# Patient Record
Sex: FEMALE | NOT HISPANIC OR LATINO | Employment: FULL TIME | URBAN - METROPOLITAN AREA
[De-identification: names, ages, dates, MRNs, and addresses within clinical notes are randomized per-mention and may not be internally consistent; named-entity substitution may affect disease eponyms.]

---

## 2022-06-16 ENCOUNTER — OFFICE VISIT (OUTPATIENT)
Dept: URGENT CARE | Facility: URGENT CARE | Age: 27
End: 2022-06-16
Payer: COMMERCIAL

## 2022-06-16 VITALS
SYSTOLIC BLOOD PRESSURE: 114 MMHG | HEART RATE: 59 BPM | TEMPERATURE: 97.6 F | DIASTOLIC BLOOD PRESSURE: 72 MMHG | OXYGEN SATURATION: 98 %

## 2022-06-16 DIAGNOSIS — H02.849 SWELLING OF EYELID, UNSPECIFIED LATERALITY: Primary | ICD-10-CM

## 2022-06-16 DIAGNOSIS — L55.9 SUNBURN: ICD-10-CM

## 2022-06-16 PROCEDURE — 99203 OFFICE O/P NEW LOW 30 MIN: CPT | Performed by: FAMILY MEDICINE

## 2022-06-16 RX ORDER — PREDNISONE 20 MG/1
40 TABLET ORAL DAILY
Qty: 10 TABLET | Refills: 0 | Status: SHIPPED | OUTPATIENT
Start: 2022-06-16 | End: 2022-06-21

## 2022-06-16 NOTE — PATIENT INSTRUCTIONS
Okay to take ibuprofen 200 mg - 4 tablets (800 mg) every 8 hours as needed.  Okay to take tylenol 500 mg - 2 tablets (1000 mg) every 6-8 hours as needed, do not exceed 3000 mg in 24 hours.  Take full course of prednisone burst to help with swelling (?allergic reaction)    Use liberal sunscreen to prevent sunburn    Okay to use refresh lubricating tears

## 2022-06-16 NOTE — PROGRESS NOTES
SUBJECTIVE:  Chief Complaint   Patient presents with     Urgent Care     FACIAL SWELLING SINCE THIS MORNING/ redness in her eyes for about a week eyes feel heavy think she may has sun postioning eyes feel really heavy .      Su Narvaez is a 26 year old female who presents with a chief complaint of facial swelling.    Got sunburned on Sunday, on face and chest.  Was outside from 11am-8pm  Did wear sunscreen and did apply several times throughout the day.    Ended up blistering on forehead and chest, ended up rubbing forehead and blisters popped and peeled off.  Noticed more eyelids have been more puffy and heavy.  Had mild redness and drainage and tearing but denies any mattering or purulent discharge from eyes.    Has been more drained.  Has been using refresh drops as she has history of dry eyes.  Has asthma, denies any SOB symptoms.    No past medical history on file.  No current outpatient medications on file.     Social History     Tobacco Use     Smoking status: Never Smoker     Smokeless tobacco: Never Used   Substance Use Topics     Alcohol use: Yes       ROS:  Review of systems negative except as stated above.    EXAM:   /72 (BP Location: Right arm, Patient Position: Sitting, Cuff Size: Adult Large)   Pulse 59   Temp 97.6  F (36.4  C)   LMP 06/15/2022   SpO2 98%   GENERAL APPEARANCE: healthy, alert and no distress  EYES: PERRL, EOM intact, conjunctiva faint pink with no mattering or drainage, bilateral eyelids mild swelling  EXTREMITIES: peripheral pulses normal  SKIN: irregular pink patches on forehead, scattered redden macules/patches on anterior chest with peeling skin  PSYCH: alert, affect bright      ASSESSMENT/PLAN:  (H02.849) Swelling of eyelid, unspecified laterality  (primary encounter diagnosis)  Plan: predniSONE (DELTASONE) 20 MG tablet            (L55.9) Sunburn  Comment: healing  Plan: sunscreen      Suspect that eyelid symptoms most likely due to movement of swelling from  burn on forehead downward but can not rule out possible allergic type reaction.  Due to eyelids symptoms will start steroid - RX prednisone burst given.  Encourage to apply cool compress to area.  Okay to continue with artificial lubricating eye drops.  Okay for tylenol and ibuprofen for discomfort.    Follow up with primary provider if no improvement of symptoms in 1 week    Kian Roth MD  June 16, 2022 5:04 PM

## 2022-07-25 ENCOUNTER — OFFICE VISIT (OUTPATIENT)
Dept: URGENT CARE | Facility: URGENT CARE | Age: 27
End: 2022-07-25
Payer: COMMERCIAL

## 2022-07-25 VITALS
OXYGEN SATURATION: 100 % | HEART RATE: 66 BPM | DIASTOLIC BLOOD PRESSURE: 68 MMHG | TEMPERATURE: 97.6 F | SYSTOLIC BLOOD PRESSURE: 100 MMHG

## 2022-07-25 DIAGNOSIS — R00.2 PALPITATIONS: Primary | ICD-10-CM

## 2022-07-25 DIAGNOSIS — M94.0 COSTOCHONDRITIS: ICD-10-CM

## 2022-07-25 PROCEDURE — 93000 ELECTROCARDIOGRAM COMPLETE: CPT | Performed by: FAMILY MEDICINE

## 2022-07-25 PROCEDURE — 99213 OFFICE O/P EST LOW 20 MIN: CPT | Performed by: FAMILY MEDICINE

## 2022-07-25 RX ORDER — NORGESTIMATE AND ETHINYL ESTRADIOL 0.25-0.035
1 KIT ORAL DAILY
COMMUNITY
Start: 2022-05-05

## 2022-07-25 RX ORDER — TOPIRAMATE 100 MG/1
100 TABLET, FILM COATED ORAL 2 TIMES DAILY
COMMUNITY
Start: 2022-07-05

## 2022-07-25 NOTE — PROGRESS NOTES
SUBJECTIVE:  Patient presents with chest pressure, palpitation, feeling of SOB over the last two weeks.  Checks on wrist monitor and wonders about significance of what she is seeing.  Working as a temp Med Assistant in another clinic.  From [facility].  Woke middle of the night once last week feeling with SOB but resolved.  No fever or cough.    No past medical history on file.  Current Outpatient Medications   Medication Sig Dispense Refill     SPRINTEC 28 0.25-35 MG-MCG tablet Take 1 tablet by mouth daily       topiramate (TOPAMAX) 100 MG tablet Take 100 mg by mouth 2 times daily       History   Smoking Status     Never Smoker   Smokeless Tobacco     Never Used         OBJECITVE;  /68 (BP Location: Right arm, Patient Position: Sitting, Cuff Size: Adult Large)   Pulse 66   Temp 97.6  F (36.4  C) (Tympanic)   LMP 06/15/2022   SpO2 100%   Alert oriented  Heart: reg without m.  See rate and Sats above  Lungs: clear  Chest:  Tender 3 and 4 costosternal junctions rt  ASSESSMENT:  Costochondritis  Palpitations  Mild anxiety    PLAN:  ECG is normal    Symptomatic therapy suggested: increase fluids and Reassure that no cardiac findings on ECT..    Follow up with primary care provider if no improvement.

## 2022-08-03 ENCOUNTER — APPOINTMENT (OUTPATIENT)
Dept: MRI IMAGING | Facility: CLINIC | Age: 27
End: 2022-08-03
Attending: EMERGENCY MEDICINE
Payer: COMMERCIAL

## 2022-08-03 ENCOUNTER — HOSPITAL ENCOUNTER (EMERGENCY)
Facility: CLINIC | Age: 27
Discharge: HOME | End: 2022-08-03
Attending: EMERGENCY MEDICINE | Admitting: EMERGENCY MEDICINE
Payer: COMMERCIAL

## 2022-08-03 VITALS
HEIGHT: 68 IN | RESPIRATION RATE: 16 BRPM | WEIGHT: 228 LBS | TEMPERATURE: 97.2 F | SYSTOLIC BLOOD PRESSURE: 99 MMHG | OXYGEN SATURATION: 95 % | HEART RATE: 58 BPM | BODY MASS INDEX: 34.56 KG/M2 | DIASTOLIC BLOOD PRESSURE: 58 MMHG

## 2022-08-03 DIAGNOSIS — R07.9 CHEST PAIN, UNSPECIFIED TYPE: ICD-10-CM

## 2022-08-03 DIAGNOSIS — R51.9 ACUTE NONINTRACTABLE HEADACHE, UNSPECIFIED HEADACHE TYPE: ICD-10-CM

## 2022-08-03 DIAGNOSIS — D35.2 PITUITARY ADENOMA (H): ICD-10-CM

## 2022-08-03 LAB
ALBUMIN SERPL-MCNC: 3.9 G/DL (ref 3.4–5)
ALP SERPL-CCNC: 44 U/L (ref 40–150)
ALT SERPL W P-5'-P-CCNC: 27 U/L (ref 0–50)
ANION GAP SERPL CALCULATED.3IONS-SCNC: 4 MMOL/L (ref 3–14)
AST SERPL W P-5'-P-CCNC: 19 U/L (ref 0–45)
ATRIAL RATE - MUSE: 66 BPM
BASOPHILS # BLD AUTO: 0 10E3/UL (ref 0–0.2)
BASOPHILS NFR BLD AUTO: 1 %
BILIRUB SERPL-MCNC: 0.3 MG/DL (ref 0.2–1.3)
BUN SERPL-MCNC: 13 MG/DL (ref 7–30)
CALCIUM SERPL-MCNC: 9 MG/DL (ref 8.5–10.1)
CHLORIDE BLD-SCNC: 111 MMOL/L (ref 94–109)
CO2 SERPL-SCNC: 23 MMOL/L (ref 20–32)
CREAT SERPL-MCNC: 0.94 MG/DL (ref 0.52–1.04)
D DIMER PPP FEU-MCNC: 0.37 UG/ML FEU (ref 0–0.5)
DIASTOLIC BLOOD PRESSURE - MUSE: NORMAL MMHG
EOSINOPHIL # BLD AUTO: 0.2 10E3/UL (ref 0–0.7)
EOSINOPHIL NFR BLD AUTO: 4 %
ERYTHROCYTE [DISTWIDTH] IN BLOOD BY AUTOMATED COUNT: 12 % (ref 10–15)
GFR SERPL CREATININE-BSD FRML MDRD: 84 ML/MIN/1.73M2
GLUCOSE BLD-MCNC: 87 MG/DL (ref 70–99)
HCT VFR BLD AUTO: 38.8 % (ref 35–47)
HGB BLD-MCNC: 12.8 G/DL (ref 11.7–15.7)
IMM GRANULOCYTES # BLD: 0 10E3/UL
IMM GRANULOCYTES NFR BLD: 0 %
INTERPRETATION ECG - MUSE: NORMAL
LYMPHOCYTES # BLD AUTO: 2.7 10E3/UL (ref 0.8–5.3)
LYMPHOCYTES NFR BLD AUTO: 60 %
MCH RBC QN AUTO: 29.2 PG (ref 26.5–33)
MCHC RBC AUTO-ENTMCNC: 33 G/DL (ref 31.5–36.5)
MCV RBC AUTO: 89 FL (ref 78–100)
MONOCYTES # BLD AUTO: 0.3 10E3/UL (ref 0–1.3)
MONOCYTES NFR BLD AUTO: 7 %
NEUTROPHILS # BLD AUTO: 1.2 10E3/UL (ref 1.6–8.3)
NEUTROPHILS NFR BLD AUTO: 28 %
NRBC # BLD AUTO: 0 10E3/UL
NRBC BLD AUTO-RTO: 0 /100
P AXIS - MUSE: 44 DEGREES
PLATELET # BLD AUTO: 263 10E3/UL (ref 150–450)
POTASSIUM BLD-SCNC: 3.8 MMOL/L (ref 3.4–5.3)
PR INTERVAL - MUSE: 164 MS
PROT SERPL-MCNC: 7.4 G/DL (ref 6.8–8.8)
QRS DURATION - MUSE: 118 MS
QT - MUSE: 414 MS
QTC - MUSE: 434 MS
R AXIS - MUSE: 31 DEGREES
RBC # BLD AUTO: 4.38 10E6/UL (ref 3.8–5.2)
SODIUM SERPL-SCNC: 138 MMOL/L (ref 133–144)
SYSTOLIC BLOOD PRESSURE - MUSE: NORMAL MMHG
T AXIS - MUSE: 18 DEGREES
TROPONIN I SERPL HS-MCNC: 4 NG/L
TSH SERPL DL<=0.005 MIU/L-ACNC: 2.25 MU/L (ref 0.4–4)
VENTRICULAR RATE- MUSE: 66 BPM
WBC # BLD AUTO: 4.4 10E3/UL (ref 4–11)

## 2022-08-03 PROCEDURE — 84146 ASSAY OF PROLACTIN: CPT | Performed by: PHYSICIAN ASSISTANT

## 2022-08-03 PROCEDURE — 96374 THER/PROPH/DIAG INJ IV PUSH: CPT | Mod: 59

## 2022-08-03 PROCEDURE — 36415 COLL VENOUS BLD VENIPUNCTURE: CPT | Performed by: EMERGENCY MEDICINE

## 2022-08-03 PROCEDURE — 84481 FREE ASSAY (FT-3): CPT | Performed by: PHYSICIAN ASSISTANT

## 2022-08-03 PROCEDURE — 99285 EMERGENCY DEPT VISIT HI MDM: CPT | Mod: 25

## 2022-08-03 PROCEDURE — 250N000011 HC RX IP 250 OP 636: Performed by: EMERGENCY MEDICINE

## 2022-08-03 PROCEDURE — 83002 ASSAY OF GONADOTROPIN (LH): CPT | Performed by: PHYSICIAN ASSISTANT

## 2022-08-03 PROCEDURE — 85379 FIBRIN DEGRADATION QUANT: CPT | Performed by: EMERGENCY MEDICINE

## 2022-08-03 PROCEDURE — 84702 CHORIONIC GONADOTROPIN TEST: CPT | Performed by: PHYSICIAN ASSISTANT

## 2022-08-03 PROCEDURE — 83001 ASSAY OF GONADOTROPIN (FSH): CPT | Performed by: PHYSICIAN ASSISTANT

## 2022-08-03 PROCEDURE — 80053 COMPREHEN METABOLIC PANEL: CPT | Performed by: EMERGENCY MEDICINE

## 2022-08-03 PROCEDURE — 96375 TX/PRO/DX INJ NEW DRUG ADDON: CPT

## 2022-08-03 PROCEDURE — 84484 ASSAY OF TROPONIN QUANT: CPT | Performed by: EMERGENCY MEDICINE

## 2022-08-03 PROCEDURE — 83003 ASSAY GROWTH HORMONE (HGH): CPT | Performed by: PHYSICIAN ASSISTANT

## 2022-08-03 PROCEDURE — 70553 MRI BRAIN STEM W/O & W/DYE: CPT

## 2022-08-03 PROCEDURE — 96361 HYDRATE IV INFUSION ADD-ON: CPT

## 2022-08-03 PROCEDURE — 82040 ASSAY OF SERUM ALBUMIN: CPT | Performed by: EMERGENCY MEDICINE

## 2022-08-03 PROCEDURE — 84305 ASSAY OF SOMATOMEDIN: CPT | Performed by: PHYSICIAN ASSISTANT

## 2022-08-03 PROCEDURE — 84443 ASSAY THYROID STIM HORMONE: CPT | Performed by: PHYSICIAN ASSISTANT

## 2022-08-03 PROCEDURE — 255N000002 HC RX 255 OP 636: Performed by: EMERGENCY MEDICINE

## 2022-08-03 PROCEDURE — A9585 GADOBUTROL INJECTION: HCPCS | Performed by: EMERGENCY MEDICINE

## 2022-08-03 PROCEDURE — 85025 COMPLETE CBC W/AUTO DIFF WBC: CPT | Performed by: EMERGENCY MEDICINE

## 2022-08-03 PROCEDURE — 258N000003 HC RX IP 258 OP 636: Performed by: EMERGENCY MEDICINE

## 2022-08-03 PROCEDURE — 36415 COLL VENOUS BLD VENIPUNCTURE: CPT | Performed by: PHYSICIAN ASSISTANT

## 2022-08-03 RX ORDER — SODIUM CHLORIDE 9 MG/ML
INJECTION, SOLUTION INTRAVENOUS CONTINUOUS
Status: DISCONTINUED | OUTPATIENT
Start: 2022-08-03 | End: 2022-08-03 | Stop reason: HOSPADM

## 2022-08-03 RX ORDER — GADOBUTROL 604.72 MG/ML
10 INJECTION INTRAVENOUS ONCE
Status: COMPLETED | OUTPATIENT
Start: 2022-08-03 | End: 2022-08-03

## 2022-08-03 RX ORDER — DIPHENHYDRAMINE HYDROCHLORIDE 50 MG/ML
25 INJECTION INTRAMUSCULAR; INTRAVENOUS ONCE
Status: COMPLETED | OUTPATIENT
Start: 2022-08-03 | End: 2022-08-03

## 2022-08-03 RX ORDER — METOCLOPRAMIDE HYDROCHLORIDE 5 MG/ML
10 INJECTION INTRAMUSCULAR; INTRAVENOUS ONCE
Status: COMPLETED | OUTPATIENT
Start: 2022-08-03 | End: 2022-08-03

## 2022-08-03 RX ADMIN — METOCLOPRAMIDE 10 MG: 5 INJECTION, SOLUTION INTRAMUSCULAR; INTRAVENOUS at 12:25

## 2022-08-03 RX ADMIN — GADOBUTROL 10 ML: 604.72 INJECTION INTRAVENOUS at 18:25

## 2022-08-03 RX ADMIN — DIPHENHYDRAMINE HYDROCHLORIDE 25 MG: 50 INJECTION, SOLUTION INTRAMUSCULAR; INTRAVENOUS at 12:24

## 2022-08-03 RX ADMIN — SODIUM CHLORIDE 1000 ML: 9 INJECTION, SOLUTION INTRAVENOUS at 12:26

## 2022-08-03 ASSESSMENT — ENCOUNTER SYMPTOMS
NAUSEA: 1
APPETITE CHANGE: 1
ABDOMINAL PAIN: 1
BLOOD IN STOOL: 0
PHOTOPHOBIA: 1
VOMITING: 0
HEADACHES: 1
EYE PAIN: 1

## 2022-08-03 NOTE — ED TRIAGE NOTES
Pt. States she has chronic migraines, had one last Friday and continues to linger. Pt. States pressure is mostly on right side of head, and right eye feels like it is going to pop out.     Triage Assessment     Row Name 08/03/22 1138       Triage Assessment (Adult)    Airway WDL WDL       Respiratory WDL    Respiratory WDL WDL       Skin Circulation/Temperature WDL    Skin Circulation/Temperature WDL WDL       Cardiac WDL    Cardiac WDL WDL       Peripheral/Neurovascular WDL    Peripheral Neurovascular WDL WDL       Cognitive/Neuro/Behavioral WDL    Cognitive/Neuro/Behavioral WDL WDL

## 2022-08-03 NOTE — DISCHARGE INSTRUCTIONS
Please follow up with a primary care clinic today.  Please trial taking omeprazole 20 mg daily for 2 weeks and stop using NSAIDs for pain.  Please return to the emergency department with any new or concerning symptoms.    Discharge Instructions  Headache    You were seen today for a headache. Headaches may be caused by many different things such as muscle tension, sinus inflammation, anxiety and stress, having too little sleep, too much alcohol, some medical conditions or injury. You may have a migraine, which is caused by changes in the blood vessels in your head.  At this time your provider does not find that your headache is a sign of anything dangerous or life-threatening.  However, sometimes the signs of serious illness do not show up right away.      Generally, every Emergency Department visit should have a follow-up clinic visit with either a primary or a specialty clinic/provider. Please follow-up as instructed by your emergency provider today.    Return to the Emergency Department if:  You get a new fever of 100.4 F or higher.  Your headache gets much worse.  You get a stiff neck with your headache.  You get a new headache that is significantly different or worse than headaches you have had before.  You are vomiting (throwing up) and cannot keep food or water down.  You have blurry or double vision or other problems with your eyes.  You have a new weakness on one side of your body.  You have difficulty with balance which is new.  You or your family thinks you are confused.  You have a seizure.    What can I do to help myself?  Pain medications - You may take a pain medication such as Tylenol  (acetaminophen), Advil , Motrin  (ibuprofen) or Aleve  (naproxen).  Take a pain reliever as soon as you notice symptoms.  Starting medications as soon as you start to have symptoms may lessen the amount of pain you have.  Relaxing in a quiet, dark room may help.  Get enough sleep and eat meals regularly.  You may need  to watch for certain foods or other things which may trigger your headaches.  Keeping a journal of your headaches and possible triggers may help you and your primary provider to identify things which you should avoid which may be causing your headaches.  If you were given a prescription for medicine here today, be sure to read all of the information (including the package insert) that comes with your prescription.  This will include important information about the medicine, its side effects, and any warnings that you need to know about.  The pharmacist who fills the prescription can provide more information and answer questions you may have about the medicine.  If you have questions or concerns that the pharmacist cannot address, please call or return to the Emergency Department.   Remember that you can always come back to the Emergency Department if you are not able to see your regular provider in the amount of time listed above, if you get any new symptoms, or if there is anything that worries you.     Discharge Instructions  Chest Pain    You have been seen today for chest pain or discomfort.  At this time, your provider has found no signs that your chest pain is due to a serious or life-threatening condition, (or you have declined more testing and/or admission to the hospital). However, sometimes there is a serious problem that does not show up right away. Your evaluation today may not be complete and you may need further testing and evaluation.     Generally, every Emergency Department visit should have a follow-up clinic visit with either a primary or a specialty clinic/provider. Please follow-up as instructed by your emergency provider today.  Return to the Emergency Department if:  Your chest pain changes, gets worse, starts to happen more often, or comes with less activity.  You are newly short of breath.  You get very weak or tired.  You pass out or faint.  You have any new symptoms, like fever, cough,  numb legs, or you cough up blood.  You have anything else that worries you.    Until you follow-up with your regular provider, please do the following:  Take one aspirin daily unless you have an allergy or are told not to by your provider.  If a stress test appointment has been made, go to the appointment.  If you have questions, contact your regular provider.  Follow-up with your regular provider/clinic as directed; this is very important.    If you were given a prescription for medicine here today, be sure to read all of the information (including the package insert) that comes with your prescription.  This will include important information about the medicine, its side effects, and any warnings that you need to know about.  The pharmacist who fills the prescription can provide more information and answer questions you may have about the medicine.  If you have questions or concerns that the pharmacist cannot address, please call or return to the Emergency Department.       Remember that you can always come back to the Emergency Department if you are not able to see your regular provider in the amount of time listed above, if you get any new symptoms, or if there is anything that worries you.

## 2022-08-03 NOTE — ED PROVIDER NOTES
"  History   Chief Complaint:  Headache       HPI   Su Narvaez is a 26 year old female with history of migraines who presents with headache. The patient reports she has had the worst migraine of her life this past Friday 7/29. She states since she has been having a lingering migraine since. She takes Topamax regularly and has been taking Excedrin extra strength 3/4 times a day since Friday. She states with her migraine Friday she had photophobia and eye pressure. The patient reports she still has painful eye pressure. She reports significantly decreased PO intake causing abdominal burning, chest pain, and nausea. The patient denies any vomiting, hematemesis, black or bloody stools. Of note patient is visiting here from [place] for work. Patient is on migraine regimen and normally gets little headaches but she states she has not had a debilitating migraine like this in years.     Over the past week she has noticed intermittently that her right eye is drifting outward and her eye was stuck. Possibly mild blurriness of her vision but nothing that has acutely changed.    Review of Systems   Constitutional: Positive for appetite change.   Eyes: Positive for photophobia and pain.   Cardiovascular: Positive for chest pain.   Gastrointestinal: Positive for abdominal pain and nausea. Negative for blood in stool and vomiting.   Neurological: Positive for headaches.   All other systems reviewed and are negative.      Allergies:  Adhesive Tape  Codeine    Medications:  Topamax   Extra Strength Excedrin  Sprintec   Desyrel    Past Medical History:     Migraines   Asthma     Past Surgical History:    Appendectomy   Left hand surgery     Social History:  The patient presents to the ED alone.     Physical Exam     Patient Vitals for the past 24 hrs:   BP Temp Temp src Pulse Resp SpO2 Height Weight   08/03/22 1134 116/74 97.2  F (36.2  C) Temporal 76 16 95 % 1.727 m (5' 8\") 103.4 kg (228 lb)       Physical Exam  Eyes:  The " pupils are equal and round    Conjunctivae and sclerae are normal  ENT:    The nose is normal    Pinnae are normal    The oropharynx is normal  Neck:  Normal range of motion    There is no rigidity noted    Trachea is in the midline  CV:  Regular rate and rhythm     No edema  Resp:  Lungs are clear    Non-labored    No rales    No wheezing   GI:  Abdomen is soft and non-tender, there is no rigidity    No distension    No rebound tenderness   MS:  Normal muscular tone    No asymmetric leg swelling  Skin:  No rash or acute skin lesions noted  Neuro:   Awake, alert, GCS 15    Speech is normal and fluent    Face is symmetric    Moves all extremities    Normal finger-nose-finger     strength equal bilaterally    Equal sensation bilaterally    Hip flexion 5/5 bilaterally     Visual fields intact    EOM normal      Emergency Department Course   ECG  ECG taken at 1229, ECG read at 1235  Normal sinus rhythm   Incomplete right bundle branch block    Rate 66 bpm. NM interval 164 ms. QRS duration 118 ms. QT/QTc 414/434 ms. P-R-T axes 44 31 18.     Imaging:  MR Brain w/o & w Contrast   Final Result   IMPRESSION: Avidly enhancing sellar and suprasellar mass measuring approximately 2.3 x 2.7 x 2.6 cm in size. 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 The mass extends into the suprasellar cistern and exerts mass effect on the optic    chiasm. Recommend neurosurgical consult.         Findings were discussed with Dr. Olvera at 1834 hours on 08/03/2022.        Report per radiology    Laboratory:  Labs Ordered and Resulted from Time of ED Arrival to Time of ED Departure   COMPREHENSIVE METABOLIC PANEL - Abnormal       Result Value    Sodium 138      Potassium 3.8      Chloride 111 (*)     Carbon Dioxide (CO2) 23      Anion Gap 4      Urea Nitrogen 13      Creatinine 0.94      Calcium 9.0      Glucose 87      Alkaline Phosphatase 44      AST 19      ALT 27      Protein Total 7.4      Albumin 3.9      Bilirubin Total  0.3      GFR Estimate 84     CBC WITH PLATELETS AND DIFFERENTIAL - Abnormal    WBC Count 4.4      RBC Count 4.38      Hemoglobin 12.8      Hematocrit 38.8      MCV 89      MCH 29.2      MCHC 33.0      RDW 12.0      Platelet Count 263      % Neutrophils 28      % Lymphocytes 60      % Monocytes 7      % Eosinophils 4      % Basophils 1      % Immature Granulocytes 0      NRBCs per 100 WBC 0      Absolute Neutrophils 1.2 (*)     Absolute Lymphocytes 2.7      Absolute Monocytes 0.3      Absolute Eosinophils 0.2      Absolute Basophils 0.0      Absolute Immature Granulocytes 0.0      Absolute NRBCs 0.0     TROPONIN I - Normal    Troponin I High Sensitivity 4     D DIMER QUANTITATIVE - Normal    D-Dimer Quantitative 0.37          Procedures    Emergency Department Course:       Reviewed:  I reviewed nursing notes, vitals and past medical history    Assessments:  1149 I obtained history and examined the patient as noted above.     Interventions:  Medications   0.9% sodium chloride BOLUS (0 mLs Intravenous Stopped 8/3/22 1326)     Followed by   sodium chloride 0.9% infusion ( Intravenous Canceled Entry 8/3/22 1230)   gadobutrol (GADAVIST) injection 10 mL (has no administration in time range)   metoclopramide (REGLAN) injection 10 mg (10 mg Intravenous Given 8/3/22 1225)   diphenhydrAMINE (BENADRYL) injection 25 mg (25 mg Intravenous Given 8/3/22 1224)     Disposition:  Care of the patient was transferred to my colleague Dr. Celestin pending NSGY consultation.    Impression & Plan     CMS Diagnoses: None    Medical Decision Making:  Su Narvaez is a 26 year old female who presents to the emergency department with headaches.  It started gradually on Friday and worsened through several days.  It had improved and returned more towards normal after using Excedrin regularly.  This morning she woke up and she had return of her headache again.  Additionally she noted that her eye was not moving normally and she feels like  this has been happening now over the past week or so.  She has a history of migraines and is on Topamax and this had been controlling her migraines very well.  She never had any advanced imaging of her brain performed before.  She notes some stomach irritation related to her Excedrin use.  Additionally she was seen in the urgent care several days ago for chest discomfort which she is still experiencing.  EKG did not show any acute ischemic changes but did show some signs of a right bundle branch block.  D-dimer then added onto her labs and was normal.  Troponin was negative.  Given duration of her symptoms doubt ACS.  Additionally with D-dimer being negative I do not think she needs further work-up for DVT or PE.  She was given Reglan and Benadryl for her headache and noted significant improvement of her symptoms.  She is able to sleep and her headache almost entirely resolved.      Given the abnormal movements of her eyes and developing headaches again, will obtain MRI to evaluate for any brain abnormality.  MRI shows pituitary macroadenoma appearance with apparent pressure on the optic chiasm. NSGY consultation recommended. Patient signed out to my partner awaiting neurosurgery consultation.    Discussed using omeprazole over-the-counter for 2 weeks for stomach irritation after Excedrin use.    She is traveling but will still be here through January or February, so recommended that she establish care with a primary care clinic here in [place].    Diagnosis:    ICD-10-CM    1. Acute nonintractable headache, unspecified headache type  R51.9    2. Chest pain, unspecified type  R07.9        Discharge Medications:  New Prescriptions    No medications on file       Scribe Disclosure:  I, Shanti Valdes, am serving as a scribe at 11:49 AM on 8/3/2022 to document services personally performed by Myke Olvera MD based on my observations and the provider's statements to me.        Myke Olvera,  MD  08/03/22 0687       Myke Olvera MD  08/03/22 6723

## 2022-08-04 LAB
FSH SERPL IRP2-ACNC: <0.3 MIU/ML
GH SERPL-MCNC: 0.2 UG/L
HCG-TM SERPL-ACNC: <3 IU/L
LH SERPL-ACNC: 0.6 MIU/ML
PROLACTIN SERPL 3RD IS-MCNC: >4700 NG/ML (ref 5–23)
T3FREE SERPL-MCNC: 2 PG/ML (ref 2–4.4)

## 2022-08-04 NOTE — PROGRESS NOTES
Contacted regarding 25 yo female presenting to ER with 4 days of headache and one week of intermittent right eye drifting.  Neuro intact in ER including EOM's.  Normal sodium level.    Brain MRI reveals pituitary mass:  IMPRESSION: Avidly enhancing sellar and suprasellar mass measuring approximately 2.3 x 2.7 x 2.6 cm in size. 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 The mass extends into the suprasellar cistern and exerts mass effect on the optic   chiasm. Recommend neurosurgical consult.    RECOMMENDATIONS:  Okay to discharge from ER.  Pituitary labs ordered.  Referral to ophthalmology.  Follow up in our clinic with Dr Braswell for further evaluation.    Discussed with Dr. Calixto.    Yazmin Weiner MPAS  [facility]  [address] [address]    Tel [phone]  Pager [phone]

## 2022-08-04 NOTE — ED PROVIDER NOTES
This patient was signed out to me by Dr. Loco pending neurosurgery consult for abnormal MRI findings consistent with possible pituitary adenoma.    Spoke with Amita Weiner from neurosurgery.  She consulted with the staff neurosurgeon and has arranged for the patient to follow-up with Dr. Braswell in clinic.      Patient will follow up with ophthalmology Dr. Busby, prior to the neurosurgery appointment, to obtain visual field testing.      The patient would feel more comfortable with discharged home at this point rather than admission to the hospital.  Headache is improved and her visual symptoms have dissipated.    Amita Weiner from neurosurgery placed several lab orders to be drawn here in the ED for follow-up in the neurosurgery clinic.      Patient return to the ER with worsening headache or worsening deficits with her extraocular movements.     Cliff Celestin MD  08/03/22 2003

## 2022-08-05 LAB — IGF-I BLD-MCNC: 40 NG/ML (ref 93–297)

## 2022-08-15 ENCOUNTER — OFFICE VISIT (OUTPATIENT)
Dept: NEUROSURGERY | Facility: CLINIC | Age: 27
End: 2022-08-15
Payer: COMMERCIAL

## 2022-08-15 VITALS — OXYGEN SATURATION: 98 % | DIASTOLIC BLOOD PRESSURE: 73 MMHG | HEART RATE: 66 BPM | SYSTOLIC BLOOD PRESSURE: 104 MMHG

## 2022-08-15 DIAGNOSIS — D35.2 PITUITARY MACROADENOMA (H): Primary | ICD-10-CM

## 2022-08-15 PROCEDURE — 99204 OFFICE O/P NEW MOD 45 MIN: CPT | Performed by: NEUROLOGICAL SURGERY

## 2022-08-15 ASSESSMENT — PAIN SCALES - GENERAL: PAINLEVEL: NO PAIN (0)

## 2022-08-15 NOTE — LETTER
8/15/2022         RE: Su Narvaez  [address]  [place] [address]        Dear Colleague,    Thank you for referring your patient, Su Narvaez, to the [facility]. Please see a copy of my visit note below.    26 year old female with prolactinoma.  Several years of chronic migraine headaches.  Had a severe right retro-orbital headache prompting ED visit on 8/3.  MR was obtained, personally reviewed, with 2.7 cm suprasellar mass with optic chiasm compression.  Headaches back to baseline since.  No vision changes or diplopia.  Is on birth control, has otherwise not had menstrual cycles for a long time.  Prolactin level >4700.  Had VF testing with no deficits.       No past medical history on file.  No past surgical history on file.  Social History     Socioeconomic History     Marital status: Single     Spouse name: Not on file     Number of children: Not on file     Years of education: Not on file     Highest education level: Not on file   Occupational History     Not on file   Tobacco Use     Smoking status: Never Smoker     Smokeless tobacco: Never Used   Substance and Sexual Activity     Alcohol use: Yes     Drug use: Never     Sexual activity: Not on file   Other Topics Concern     Not on file   Social History Narrative     Not on file     Social Determinants of Health     Financial Resource Strain: Not on file   Food Insecurity: Not on file   Transportation Needs: Not on file   Physical Activity: Not on file   Stress: Not on file   Social Connections: Not on file   Intimate Partner Violence: Not on file   Housing Stability: Not on file     No family history on file.     ROS: 10 point ROS neg other than the symptoms noted above in the HPI.    Physical Exam  /73   Pulse 66   LMP 07/12/2022 (Exact Date)   SpO2 98%   HEENT:  Normocephalic, atraumatic.  PERRLA.  EOM s intact.  Visual fields full to gross exam  Neck:  Supple, non-tender, without  lymphadenopathy.  Heart:  No peripheral edema  Lungs:  No SOB  Abdomen:  Non-distended.   Skin:  Warm and dry.  Extremities:  No edema, cyanosis or clubbing.  Psychiatric:  No apparent distress  Musculoskeletal:  Normal bulk and tone    NEUROLOGICAL EXAMINATION:     Mental status:  Alert and Oriented x 3, speech is fluent.  Cranial nerves:  II-XII intact.   Motor:    Shoulder Abduction:  Right:  5/5   Left:  5/5  Biceps:                      Right:  5/5   Left:  5/5  Triceps:                     Right:  5/5   Left:  5/5  Wrist Extensors:       Right:  5/5   Left:  5/5  Wrist Flexors:           Right:  5/5   Left:  5/5  interosseus :            Right:  5/5   Left:  5/5  Hip Flexion:                Right: 5/5  Left:  5/5  Quadriceps:             Right:  5/5  Left:  5/5  Hamstrings:             Right:  5/5  Left:  5/5  Gastroc Soleus:        Right:  5/5  Left:  5/5  Tib/Ant:                      Right:  5/5  Left:  5/5  EHL:                     Right:  5/5  Left:  5/5  Sensation:  Intact  Reflexes:  Negative Babinski.  Negative Clonus.  Negative Hoskins's.  Coordination:  Smooth finger to nose testing.   Negative pronator drift.  Smooth tandem walking.    A/P:  26 year old female with prolactinoma    I had a discussion with the patient, reviewing the history, symptoms, and imaging  Will refer for Dr. Donohue to discuss medical treatment options         Again, thank you for allowing me to participate in the care of your patient.        Sincerely,        Brian Braswell MD

## 2022-08-15 NOTE — PATIENT INSTRUCTIONS
Patient Next Steps:     A referral has been placed for you to see Dr. Donohue with Endocrine.     Please call us if you have any further questions or concerns.    [facility]   Phone: [phone]  Fax: [phone]

## 2022-08-15 NOTE — PROGRESS NOTES
26 year old female with prolactinoma.  Several years of chronic migraine headaches.  Had a severe right retro-orbital headache prompting ED visit on 8/3.  MR was obtained, personally reviewed, with 2.7 cm suprasellar mass with optic chiasm compression.  Headaches back to baseline since.  No vision changes or diplopia.  Is on birth control, has otherwise not had menstrual cycles for a long time.  Prolactin level >4700.  Had VF testing with no deficits.       No past medical history on file.  No past surgical history on file.  Social History     Socioeconomic History     Marital status: Single     Spouse name: Not on file     Number of children: Not on file     Years of education: Not on file     Highest education level: Not on file   Occupational History     Not on file   Tobacco Use     Smoking status: Never Smoker     Smokeless tobacco: Never Used   Substance and Sexual Activity     Alcohol use: Yes     Drug use: Never     Sexual activity: Not on file   Other Topics Concern     Not on file   Social History Narrative     Not on file     Social Determinants of Health     Financial Resource Strain: Not on file   Food Insecurity: Not on file   Transportation Needs: Not on file   Physical Activity: Not on file   Stress: Not on file   Social Connections: Not on file   Intimate Partner Violence: Not on file   Housing Stability: Not on file     No family history on file.     ROS: 10 point ROS neg other than the symptoms noted above in the HPI.    Physical Exam  /73   Pulse 66   LMP 07/12/2022 (Exact Date)   SpO2 98%   HEENT:  Normocephalic, atraumatic.  PERRLA.  EOM s intact.  Visual fields full to gross exam  Neck:  Supple, non-tender, without lymphadenopathy.  Heart:  No peripheral edema  Lungs:  No SOB  Abdomen:  Non-distended.   Skin:  Warm and dry.  Extremities:  No edema, cyanosis or clubbing.  Psychiatric:  No apparent distress  Musculoskeletal:  Normal bulk and tone    NEUROLOGICAL EXAMINATION:     Mental  status:  Alert and Oriented x 3, speech is fluent.  Cranial nerves:  II-XII intact.   Motor:    Shoulder Abduction:  Right:  5/5   Left:  5/5  Biceps:                      Right:  5/5   Left:  5/5  Triceps:                     Right:  5/5   Left:  5/5  Wrist Extensors:       Right:  5/5   Left:  5/5  Wrist Flexors:           Right:  5/5   Left:  5/5  interosseus :            Right:  5/5   Left:  5/5  Hip Flexion:                Right: 5/5  Left:  5/5  Quadriceps:             Right:  5/5  Left:  5/5  Hamstrings:             Right:  5/5  Left:  5/5  Gastroc Soleus:        Right:  5/5  Left:  5/5  Tib/Ant:                      Right:  5/5  Left:  5/5  EHL:                     Right:  5/5  Left:  5/5  Sensation:  Intact  Reflexes:  Negative Babinski.  Negative Clonus.  Negative Hoskins's.  Coordination:  Smooth finger to nose testing.   Negative pronator drift.  Smooth tandem walking.    A/P:  26 year old female with prolactinoma    I had a discussion with the patient, reviewing the history, symptoms, and imaging  Will refer for Dr. Donohue to discuss medical treatment options

## 2022-08-16 ENCOUNTER — VIRTUAL VISIT (OUTPATIENT)
Dept: ENDOCRINOLOGY | Facility: CLINIC | Age: 27
End: 2022-08-16
Attending: NEUROLOGICAL SURGERY
Payer: COMMERCIAL

## 2022-08-16 DIAGNOSIS — D35.2 PITUITARY MACROADENOMA (H): Primary | ICD-10-CM

## 2022-08-16 DIAGNOSIS — D35.2 PROLACTINOMA (H): ICD-10-CM

## 2022-08-16 PROCEDURE — 99204 OFFICE O/P NEW MOD 45 MIN: CPT | Mod: 95 | Performed by: INTERNAL MEDICINE

## 2022-08-16 RX ORDER — CABERGOLINE 0.5 MG/1
0.25 TABLET ORAL
Qty: 12 TABLET | Refills: 3 | Status: SHIPPED | OUTPATIENT
Start: 2022-08-18 | End: 2022-10-10

## 2022-08-16 NOTE — PROGRESS NOTES
Su Narvaez  is being evaluated via a billable video visit.      How would you like to obtain your AVS? N/A  For the video visit, send the invitation by: Text to cell phone: [phone]  Will anyone else be joining your video visit? No    Video start 4:00 pm  End: 4:40 pm  Amwell                                                                             - Endocrinology Initial Consultation -    Reason for visit/consult:  Pituitary macroadenoma (H)    Primary care provider: No Ref-Primary, Physician    HPI: A 25 yo female here for the evaluation of elevated prolactin level.   Patient has had migraine HA since 2016, which required hospital visits 3 times in 10/2016, 2018 and 8/3/2022. Past 2 visit, she did not have any MRI done. Last one, she described she could not tolerate with severe HA and nausea. She underwent to MRI which revealed macro lesion.   Denied vision disturbance. Lab work up showed dicreased IGF1 40 and significantly increased prolactin level >4700. She met with Dr. Braswell yesterday and she was referred to endocrinology office.   Other medical history : menarche age 10, and has been irregular cycle since age 14, currently on BCPs. Dyslipidemia for the past 2-3 years, A1C 4.9. Appenedctomy, carpal tunnel syndrome surgery in 12/2020. Denied galactorrhea       Past Medical/Surgical History:  No past medical history on file.  No past surgical history on file.    Allergies:  Allergies   Allergen Reactions     Adhesive Tape      Codeine        Current Medications   Current Outpatient Medications   Medication     SPRINTEC 28 0.25-35 MG-MCG tablet     topiramate (TOPAMAX) 100 MG tablet     No current facility-administered medications for this visit.       Family History:  No family history on file.    Social History:  Social History     Tobacco Use     Smoking status: Never Smoker     Smokeless tobacco: Never Used   Substance Use Topics     Alcohol use: Yes       ROS:  Full review of systems taken  with the help of the intake sheet. Otherwise a complete 14 point review of systems was taken and is negative unless stated in the history above.      Physical Exam:   Vitals: LMP 07/12/2022 (Exact Date)   BMI= There is no height or weight on file to calculate BMI.   General: well appearing, no acute distress, pleasant and conversant,   Mental Status/neuro: alert and oriented  Face: symmetrical, normal facial color  Eyes: anicteric, no proptosis or lid lag  Resp: normally breathing      Labs : I reviewed data from epic and extract and summarize the pertinent data here.   Lab Results   Component Value Date     08/03/2022      Lab Results   Component Value Date    POTASSIUM 3.8 08/03/2022     Lab Results   Component Value Date    CHLORIDE 111 08/03/2022     Lab Results   Component Value Date    ERLINDA 9.0 08/03/2022     Lab Results   Component Value Date    CO2 23 08/03/2022     Lab Results   Component Value Date    BUN 13 08/03/2022     Lab Results   Component Value Date    CR 0.94 08/03/2022     Lab Results   Component Value Date    GLC 87 08/03/2022     Lab Results   Component Value Date    TSH 2.25 08/03/2022     No results found for: T4  No results found for: A1C    No results found for: IGF1  No results found for: LH  Lab Results   Component Value Date    FSH <0.3 08/03/2022     No results found for: ESTROGEN  No results found for: PROLACTIN        MRI Brain: I personally reviewed the original images and agree with the below reports.   Results for orders placed during the hospital encounter of 08/03/22    MR Brain w/o & w Contrast    Narrative  EXAM: MR BRAIN W/O and W CONTRAST  LOCATION: [facility]  DATE/TIME: 8/3/2022 6:02 PM    INDICATION: Headache, right eye weakness.    COMPARISON: None.    CONTRAST: Gadobutrol (GADAVIST) injection 10 mL.    TECHNIQUE: Routine multiplanar multisequence head MRI without and with intravenous contrast.    FINDINGS:  INTRACRANIAL CONTENTS: No acute  or subacute infarct. No acute intracranial hemorrhage. Normal brain parenchymal signal. Normal ventricles and sulci. Normal position of the cerebellar tonsils. See below for sella and suprasellar findings. Otherwise no  abnormal parenchymal enhancement.    SELLA: There is an avidly enhancing sellar and suprasellar mass, measuring approximately 2.3 x 2.7 x 2.6 cm in diameter. The mass extends into the suprasellar cistern and abuts the optic chiasm. The mass extends laterally along the cavernous sinuses. The  vascular flow voids are grossly preserved.    OSSEOUS STRUCTURES/SOFT TISSUES: Normal marrow signal. The major intracranial vascular flow voids are maintained.    ORBITS: No abnormality accounting for technique.    SINUSES/MASTOIDS: Trace ethmoid mucosal thickening. Small right mastoid effusion.    Impression  IMPRESSION: Avidly enhancing sellar and suprasellar mass measuring approximately 2.3 x 2.7 x 2.6 cm in size. 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 The mass extends into the suprasellar cistern and exerts mass effect on the optic  chiasm. Recommend neurosurgical consult.      Findings were discussed with Dr. Olvera at 1834 hours on 08/03/2022.          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        Natalya Donohue MD  Staff Physician  Endocrinology and Metabolism  License: [id number]

## 2022-08-16 NOTE — LETTER
8/16/2022       RE: Su Narvaez  [address]  [place] [address]     Dear Colleague,    Thank you for referring your patient, Su Narvaez, to the [facility] [place] [facility]. Please see a copy of my visit note below.    Su Narvaez  is being evaluated via a billable video visit.      How would you like to obtain your AVS? N/A  For the video visit, send the invitation by: Text to cell phone: [phone]  Will anyone else be joining your video visit? No                                                                                 - Endocrinology Initial Consultation -    Reason for visit/consult:  Pituitary macroadenoma (H)    Primary care provider: No Ref-Primary, Physician    HPI: A 25 yo female here for the evaluation of elevated prolactin level.   Patient has had migraine HA since 2016, which required hospital visits 3 times in 10/2016, 2018 and 8/3/2022. Past 2 visit, she did not have any MRI done. Last one, she described she could not tolerate with severe HA and nausea. She underwent to MRI which revealed macro lesion.   Denied vision disturbance. Lab work up showed dicreased IGF1 40 and significantly increased prolactin level >4700. She met with Dr. Braswell yesterday and she was referred to endocrinology office.   Other medical history : menarche age 10, and has been irregular cycle since age 14, currently on BCPs. Dyslipidemia for the past 2-3 years, A1C 4.9. Appenedctomy, carpal tunnel syndrome surgery in 12/2020. Denied galactorrhea       Past Medical/Surgical History:  No past medical history on file.  No past surgical history on file.    Allergies:  Allergies   Allergen Reactions     Adhesive Tape      Codeine        Current Medications   Current Outpatient Medications   Medication     SPRINTEC 28 0.25-35 MG-MCG tablet     topiramate (TOPAMAX) 100 MG tablet     No current  facility-administered medications for this visit.       Family History:  No family history on file.    Social History:  Social History     Tobacco Use     Smoking status: Never Smoker     Smokeless tobacco: Never Used   Substance Use Topics     Alcohol use: Yes       ROS:  Full review of systems taken with the help of the intake sheet. Otherwise a complete 14 point review of systems was taken and is negative unless stated in the history above.      Physical Exam:   Vitals: LMP 07/12/2022 (Exact Date)   BMI= There is no height or weight on file to calculate BMI.   General: well appearing, no acute distress, pleasant and conversant,   Mental Status/neuro: alert and oriented  Face: symmetrical, normal facial color  Eyes: anicteric, no proptosis or lid lag  Resp: normally breathing      Labs : I reviewed data from epic and extract and summarize the pertinent data here.   Lab Results   Component Value Date     08/03/2022      Lab Results   Component Value Date    POTASSIUM 3.8 08/03/2022     Lab Results   Component Value Date    CHLORIDE 111 08/03/2022     Lab Results   Component Value Date    ERLINDA 9.0 08/03/2022     Lab Results   Component Value Date    CO2 23 08/03/2022     Lab Results   Component Value Date    BUN 13 08/03/2022     Lab Results   Component Value Date    CR 0.94 08/03/2022     Lab Results   Component Value Date    GLC 87 08/03/2022     Lab Results   Component Value Date    TSH 2.25 08/03/2022     No results found for: T4  No results found for: A1C    No results found for: IGF1  No results found for: LH  Lab Results   Component Value Date    FSH <0.3 08/03/2022     No results found for: ESTROGEN  No results found for: PROLACTIN        MRI Brain: I personally reviewed the original images and agree with the below reports.   Results for orders placed during the hospital encounter of 08/03/22    MR Brain w/o & w Contrast    Narrative  EXAM: MR BRAIN W/O and W CONTRAST  LOCATION: [facility]  DATE/TIME: 8/3/2022 6:02 PM    INDICATION: Headache, right eye weakness.    COMPARISON: None.    CONTRAST: Gadobutrol (GADAVIST) injection 10 mL.    TECHNIQUE: Routine multiplanar multisequence head MRI without and with intravenous contrast.    FINDINGS:  INTRACRANIAL CONTENTS: No acute or subacute infarct. No acute intracranial hemorrhage. Normal brain parenchymal signal. Normal ventricles and sulci. Normal position of the cerebellar tonsils. See below for sella and suprasellar findings. Otherwise no  abnormal parenchymal enhancement.    SELLA: There is an avidly enhancing sellar and suprasellar mass, measuring approximately 2.3 x 2.7 x 2.6 cm in diameter. The mass extends into the suprasellar cistern and abuts the optic chiasm. The mass extends laterally along the cavernous sinuses. The  vascular flow voids are grossly preserved.    OSSEOUS STRUCTURES/SOFT TISSUES: Normal marrow signal. The major intracranial vascular flow voids are maintained.    ORBITS: No abnormality accounting for technique.    SINUSES/MASTOIDS: Trace ethmoid mucosal thickening. Small right mastoid effusion.    Impression  IMPRESSION: Avidly enhancing sellar and suprasellar mass measuring approximately 2.3 x 2.7 x 2.6 cm in size. 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 The mass extends into the suprasellar cistern and exerts mass effect on the optic  chiasm. Recommend neurosurgical consult.      Findings were discussed with Dr. Olvera at 1834 hours on 08/03/2022.          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        Natalya Donohue MD  Staff Physician  Endocrinology and Metabolism  License: [id number]

## 2022-08-17 ENCOUNTER — LAB (OUTPATIENT)
Dept: LAB | Facility: CLINIC | Age: 27
End: 2022-08-17
Payer: COMMERCIAL

## 2022-08-17 DIAGNOSIS — D35.2 PITUITARY MACROADENOMA (H): ICD-10-CM

## 2022-08-17 LAB
CORTIS SERPL-MCNC: 4.8 UG/DL
ESTRADIOL SERPL-MCNC: <5 PG/ML
FSH SERPL IRP2-ACNC: 0.6 MIU/ML
LH SERPL-ACNC: 0.8 MIU/ML
PROLACTIN SERPL 3RD IS-MCNC: 3608 NG/ML (ref 5–23)
T4 FREE SERPL-MCNC: 0.68 NG/DL (ref 0.76–1.46)

## 2022-08-17 PROCEDURE — 82533 TOTAL CORTISOL: CPT | Performed by: INTERNAL MEDICINE

## 2022-08-17 PROCEDURE — 83001 ASSAY OF GONADOTROPIN (FSH): CPT | Performed by: INTERNAL MEDICINE

## 2022-08-17 PROCEDURE — 82024 ASSAY OF ACTH: CPT | Performed by: INTERNAL MEDICINE

## 2022-08-17 PROCEDURE — 84439 ASSAY OF FREE THYROXINE: CPT | Performed by: PATHOLOGY

## 2022-08-17 PROCEDURE — 82670 ASSAY OF TOTAL ESTRADIOL: CPT | Performed by: INTERNAL MEDICINE

## 2022-08-17 PROCEDURE — 84146 ASSAY OF PROLACTIN: CPT | Performed by: INTERNAL MEDICINE

## 2022-08-17 PROCEDURE — 83002 ASSAY OF GONADOTROPIN (LH): CPT | Performed by: INTERNAL MEDICINE

## 2022-08-17 PROCEDURE — 36415 COLL VENOUS BLD VENIPUNCTURE: CPT | Performed by: PATHOLOGY

## 2022-08-18 LAB — ACTH PLAS-MCNC: <10 PG/ML

## 2022-08-24 NOTE — RESULT ENCOUNTER NOTE
Dear Su     Here is your results. OK lower than before, contineu current cabergoline please and repeat blood work in 1-2 month please    Please call nursing line, if you are [facility] patient at [phone], if you are [facility] patient at [phone],  if you have any questions.    Please take care  Natalya Donohue MD

## 2022-08-31 DIAGNOSIS — E03.9 HYPOTHYROIDISM: Primary | ICD-10-CM

## 2022-08-31 RX ORDER — LEVOTHYROXINE SODIUM 75 UG/1
75 TABLET ORAL DAILY
Qty: 90 TABLET | Refills: 0 | Status: SHIPPED | OUTPATIENT
Start: 2022-08-31 | End: 2022-11-21

## 2022-10-03 ENCOUNTER — HEALTH MAINTENANCE LETTER (OUTPATIENT)
Age: 27
End: 2022-10-03

## 2022-10-10 ENCOUNTER — TELEPHONE (OUTPATIENT)
Dept: ENDOCRINOLOGY | Facility: CLINIC | Age: 27
End: 2022-10-10

## 2022-10-10 DIAGNOSIS — D35.2 PITUITARY MACROADENOMA (H): ICD-10-CM

## 2022-10-10 RX ORDER — CABERGOLINE 0.5 MG/1
0.25 TABLET ORAL
Qty: 12 TABLET | Refills: 3 | Status: SHIPPED | OUTPATIENT
Start: 2022-10-10 | End: 2022-11-21

## 2022-10-10 NOTE — TELEPHONE ENCOUNTER
Refills are available by calling the pharmacy . Last script sent 8/2022 with 3 refills. Ele Carr RN on 10/10/2022 at 2:39 PM

## 2022-10-10 NOTE — TELEPHONE ENCOUNTER
"[facility] Call Center    Phone Message    May a detailed message be left on voicemail: yes     Reason for Call: Medication Refill Request    Has the patient contacted the pharmacy for the refill?     Patient is needing  Refill on cabergoline. EE states that she will run out in \"a couple days.       Action Taken: Other: Endo    Travel Screening: Not Applicable                                                                      "

## 2022-11-01 ENCOUNTER — MYC MEDICAL ADVICE (OUTPATIENT)
Dept: ENDOCRINOLOGY | Facility: CLINIC | Age: 27
End: 2022-11-01

## 2022-11-03 ENCOUNTER — MYC MEDICAL ADVICE (OUTPATIENT)
Dept: NEUROSURGERY | Facility: CLINIC | Age: 27
End: 2022-11-03

## 2022-11-03 DIAGNOSIS — D35.2 PITUITARY MACROADENOMA (H): Primary | ICD-10-CM

## 2022-11-03 NOTE — TELEPHONE ENCOUNTER
"Last Visit: 8/15/22    Next Visit: na    Name of Provider:  Dr Braswell recommends  referral Dr. Donohue to discuss medical treatment options    Assessment: Dx of prolactinoma. Has continued to have migraines on/off since seeing neurosrugery and endo in August 2022.  This current migraine started Monday and this is the longest episode she has ever had.   The symptoms that are associated with her migraines are:  nausea, sensitivity to light, ears ringing, feeling pressure on the right side of my head and back of neck is tight. These are not new symptoms and only happen when she Is having a migraine.   She is due to follow up with Dr Donohue in January with an MRI prior.   Per patient her labs have improved labs    Per patient she was advised to \"Stay away\" from NSAIDS. She sends mychart if she can take Excedrin again       Recommendation given: Route to Dr Braswell for update and ok to try Excedrin again today to see if it will help for this episode. Reiterate importance of taking as little NSAID medication as possible due to side effects.    Action needed from provider: na        "

## 2022-11-04 ENCOUNTER — LAB (OUTPATIENT)
Dept: LAB | Facility: CLINIC | Age: 27
End: 2022-11-04
Payer: COMMERCIAL

## 2022-11-04 DIAGNOSIS — D35.2 PITUITARY MACROADENOMA (H): ICD-10-CM

## 2022-11-04 DIAGNOSIS — E03.9 HYPOTHYROIDISM: ICD-10-CM

## 2022-11-04 LAB
PROLACTIN SERPL 3RD IS-MCNC: 285 NG/ML (ref 5–23)
T4 FREE SERPL-MCNC: 1.1 NG/DL (ref 0.76–1.46)
TSH SERPL DL<=0.005 MIU/L-ACNC: 0.56 MU/L (ref 0.4–4)

## 2022-11-04 PROCEDURE — 36415 COLL VENOUS BLD VENIPUNCTURE: CPT

## 2022-11-04 PROCEDURE — 84146 ASSAY OF PROLACTIN: CPT

## 2022-11-04 PROCEDURE — 84443 ASSAY THYROID STIM HORMONE: CPT

## 2022-11-04 PROCEDURE — 84439 ASSAY OF FREE THYROXINE: CPT

## 2022-11-06 NOTE — RESULT ENCOUNTER NOTE
Dear Su     Here is your results. Very impressively working well!   How are you doing? I suggest to increase medication, cabergoline from half tab twice a week to 1 tab twice a week (2 tabs per week), what do you think? Are you tolerating medication?     Please call nursing line, if you are [facility] patient at [phone], if you are [facility] patient at [phone],  if you have any questions.    Please take care  Natalya Donohue MD

## 2022-11-11 ENCOUNTER — TRANSFERRED RECORDS (OUTPATIENT)
Dept: HEALTH INFORMATION MANAGEMENT | Facility: CLINIC | Age: 27
End: 2022-11-11

## 2022-11-21 DIAGNOSIS — D35.2 PITUITARY MACROADENOMA (H): ICD-10-CM

## 2022-11-21 DIAGNOSIS — E03.9 HYPOTHYROIDISM: ICD-10-CM

## 2022-11-21 RX ORDER — CABERGOLINE 0.5 MG/1
TABLET ORAL
Qty: 24 TABLET | Refills: 3 | Status: SHIPPED | OUTPATIENT
Start: 2022-11-21

## 2022-11-21 RX ORDER — LEVOTHYROXINE SODIUM 75 UG/1
TABLET ORAL
Qty: 90 TABLET | Refills: 3 | Status: SHIPPED | OUTPATIENT
Start: 2022-11-21

## 2022-11-21 RX ORDER — LEVOTHYROXINE SODIUM 75 UG/1
TABLET ORAL
Qty: 90 TABLET | Refills: 3 | Status: CANCELLED | OUTPATIENT
Start: 2022-11-21

## 2022-11-21 NOTE — TELEPHONE ENCOUNTER
[facility] Call Center    Phone Message    May a detailed message be left on voicemail: yes     Reason for Call: Medication Refill Request    Has the patient contacted the pharmacy for the refill? Yes   Name of medication being requested:   cabergoline (DOSTINEX) 0.5 MG tablet     levothyroxine (SYNTHROID/LEVOTHROID) 75 MCG tablet       Provider who prescribed the medication: Jayde    Pharmacy: [facility], [place] - [address]    Date medication is needed: as soon as possible        Action Taken: Message routed to:  Other: endo    Travel Screening: Not Applicable

## 2022-12-20 ENCOUNTER — HOSPITAL ENCOUNTER (OUTPATIENT)
Dept: MRI IMAGING | Facility: CLINIC | Age: 27
Discharge: HOME | End: 2022-12-20
Attending: INTERNAL MEDICINE | Admitting: INTERNAL MEDICINE
Payer: COMMERCIAL

## 2022-12-20 DIAGNOSIS — D35.2 PITUITARY MACROADENOMA (H): ICD-10-CM

## 2022-12-20 PROCEDURE — A9585 GADOBUTROL INJECTION: HCPCS | Performed by: INTERNAL MEDICINE

## 2022-12-20 PROCEDURE — 255N000002 HC RX 255 OP 636: Performed by: INTERNAL MEDICINE

## 2022-12-20 PROCEDURE — 70543 MRI ORBT/FAC/NCK W/O &W/DYE: CPT

## 2022-12-20 RX ORDER — GADOBUTROL 604.72 MG/ML
10 INJECTION INTRAVENOUS ONCE
Status: COMPLETED | OUTPATIENT
Start: 2022-12-20 | End: 2022-12-20

## 2022-12-20 RX ADMIN — GADOBUTROL 10 ML: 604.72 INJECTION INTRAVENOUS at 10:56

## 2022-12-31 ENCOUNTER — TELEPHONE (OUTPATIENT)
Dept: ENDOCRINOLOGY | Facility: CLINIC | Age: 27
End: 2022-12-31

## 2023-01-04 ENCOUNTER — VIRTUAL VISIT (OUTPATIENT)
Dept: ENDOCRINOLOGY | Facility: CLINIC | Age: 28
End: 2023-01-04
Payer: COMMERCIAL

## 2023-01-04 ENCOUNTER — TELEPHONE (OUTPATIENT)
Dept: ENDOCRINOLOGY | Facility: CLINIC | Age: 28
End: 2023-01-04

## 2023-01-04 DIAGNOSIS — D35.2 PROLACTINOMA (H): Primary | ICD-10-CM

## 2023-01-04 DIAGNOSIS — D35.2 PITUITARY ADENOMA (H): ICD-10-CM

## 2023-01-04 PROCEDURE — 99214 OFFICE O/P EST MOD 30 MIN: CPT | Mod: 95 | Performed by: INTERNAL MEDICINE

## 2023-01-04 NOTE — LETTER
1/4/2023       RE: Su Narvaez  [address]  [facility] [address]     Dear Colleague,    Thank you for referring your patient, Su Narvaez, to the [facility] [place] at [facility]. Please see a copy of my visit note below.    Su Narvaez is being evaluated via a billable video visit.        How would you like to obtain your AVS? MyChart  For the video visit, send the invitation by: Text to cell phone: [phone]  Will anyone else be joining your video visit? No      Su Narvaez  is being evaluated via a billable video visit.      How would you like to obtain your AVS? N/A  For the video visit, send the invitation by: Text to cell phone: [phone]  Will anyone else be joining your video visit? No    Video start 8:40 am  End: 9:00 am  [facility]                                                                             - Endocrinology Initial Consultation -    Reason for visit/consult:  Pituitary macroadenoma (H)    Primary care provider: No Ref-Primary, Physician        Assessment and Plan  A 27 year old female with macroprolactinoma    - repeat PRL     - continue cabergoline 0.5 mg twice a week    Macro lesion  Repeated MRI showed shrinkage    - repeat MRI in 1 year (winter 2023)    RTC with me in 6 month       30 minutes spent on the date of the encounter doing chart review, history and exam, reviewing original brain MRI, communicating with care team, documentation and further activities as noted above.        Natalya Donohue MD  Staff Physician  Endocrinology and Metabolism  License: [id number]      Interval History as of 1/4/2023 : Patient has been doing well. Tolerating cabergoline. Medication compliance excellent  . New event includes: PRL reduced from 4700 to 285 in 4 months, then now increase dose 2 tabs a week for the past 2 month. Less HA.   .  HPI: A 27 yo female here for the evaluation of  elevated prolactin level.   Patient has had migraine HA since 2016, which required hospital visits 3 times in 10/2016, 2018 and 8/3/2022. Past 2 visit, she did not have any MRI done. Last one, she described she could not tolerate with severe HA and nausea. She underwent to MRI which revealed macro lesion.   Denied vision disturbance. Lab work up showed dicreased IGF1 40 and significantly increased prolactin level >4700. She met with Dr. Braswell yesterday and she was referred to endocrinology office.   Other medical history : menarche age 10, and has been irregular cycle since age 14, currently on BCPs. Dyslipidemia for the past 2-3 years, A1C 4.9. Appenedctomy, carpal tunnel syndrome surgery in 12/2020. Denied galactorrhea       Past Medical/Surgical History:  No past medical history on file.  No past surgical history on file.    Allergies:  Allergies   Allergen Reactions     Adhesive Tape      Codeine        Current Medications   Current Outpatient Medications   Medication     cabergoline (DOSTINEX) 0.5 MG tablet     levothyroxine (SYNTHROID/LEVOTHROID) 75 MCG tablet     SPRINTEC 28 0.25-35 MG-MCG tablet     topiramate (TOPAMAX) 100 MG tablet     No current facility-administered medications for this visit.       Family History:  No family history on file.    Social History:  Social History     Tobacco Use     Smoking status: Never     Smokeless tobacco: Never   Substance Use Topics     Alcohol use: Yes       ROS:  Full review of systems taken with the help of the intake sheet. Otherwise a complete 14 point review of systems was taken and is negative unless stated in the history above.      Physical Exam:   Vitals: There were no vitals taken for this visit.  BMI= There is no height or weight on file to calculate BMI.   General: well appearing, no acute distress, pleasant and conversant,   Mental Status/neuro: alert and oriented  Face: symmetrical, normal facial color  Eyes: anicteric, no proptosis or lid lag  Resp:  normally breathing      Labs : I reviewed data from epic and extract and summarize the pertinent data here.   Lab Results   Component Value Date     08/03/2022      Lab Results   Component Value Date    POTASSIUM 3.8 08/03/2022     Lab Results   Component Value Date    CHLORIDE 111 08/03/2022     Lab Results   Component Value Date    ERLINDA 9.0 08/03/2022     Lab Results   Component Value Date    CO2 23 08/03/2022     Lab Results   Component Value Date    BUN 13 08/03/2022     Lab Results   Component Value Date    CR 0.94 08/03/2022     Lab Results   Component Value Date    GLC 87 08/03/2022     Lab Results   Component Value Date    TSH 2.25 08/03/2022     No results found for: T4  No results found for: A1C    No results found for: IGF1  No results found for: LH  Lab Results   Component Value Date    FSH <0.3 08/03/2022     No results found for: ESTROGEN  No results found for: PROLACTIN        MRI Brain: I personally reviewed the original images and agree with the below reports.   Results for orders placed during the hospital encounter of 08/03/22    MR Brain w/o & w Contrast    Narrative  EXAM: MR BRAIN W/O and W CONTRAST  LOCATION: [facility]  DATE/TIME: 8/3/2022 6:02 PM    INDICATION: Headache, right eye weakness.    COMPARISON: None.    CONTRAST: Gadobutrol (GADAVIST) injection 10 mL.    TECHNIQUE: Routine multiplanar multisequence head MRI without and with intravenous contrast.    FINDINGS:  INTRACRANIAL CONTENTS: No acute or subacute infarct. No acute intracranial hemorrhage. Normal brain parenchymal signal. Normal ventricles and sulci. Normal position of the cerebellar tonsils. See below for sella and suprasellar findings. Otherwise no  abnormal parenchymal enhancement.    SELLA: There is an avidly enhancing sellar and suprasellar mass, measuring approximately 2.3 x 2.7 x 2.6 cm in diameter. The mass extends into the suprasellar cistern and abuts the optic chiasm. The mass extends  laterally along the cavernous sinuses. The  vascular flow voids are grossly preserved.    OSSEOUS STRUCTURES/SOFT TISSUES: Normal marrow signal. The major intracranial vascular flow voids are maintained.    ORBITS: No abnormality accounting for technique.    SINUSES/MASTOIDS: Trace ethmoid mucosal thickening. Small right mastoid effusion.    Impression  IMPRESSION: Avidly enhancing sellar and suprasellar mass measuring approximately 2.3 x 2.7 x 2.6 cm in size. 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 The mass extends into the suprasellar cistern and exerts mass effect on the optic  chiasm. Recommend neurosurgical consult.      Findings were discussed with Dr. Olvera at 1834 hours on 08/03/2022.

## 2023-01-04 NOTE — PROGRESS NOTES
Su Narvaez is being evaluated via a billable video visit.        How would you like to obtain your AVS? MyChart  For the video visit, send the invitation by: Text to cell phone: [phone]  Will anyone else be joining your video visit? No      Su Narvaez  is being evaluated via a billable video visit.      How would you like to obtain your AVS? N/A  For the video visit, send the invitation by: Text to cell phone: [phone]  Will anyone else be joining your video visit? No    Video start 8:40 am  End: 9:00 am  [facility]                                                                             - Endocrinology Initial Consultation -    Reason for visit/consult:  Pituitary macroadenoma (H)    Primary care provider: No Ref-Primary, Physician        Assessment and Plan  A 27 year old female with macroprolactinoma    - repeat PRL     - continue cabergoline 0.5 mg twice a week    Macro lesion  Repeated MRI showed shrinkage    - repeat MRI in 1 year (winter 2023)    RTC with me in 6 month       30 minutes spent on the date of the encounter doing chart review, history and exam, reviewing original brain MRI, communicating with care team, documentation and further activities as noted above.        Natalya Donohue MD  Staff Physician  Endocrinology and Metabolism  License: [id number]      Interval History as of 1/4/2023 : Patient has been doing well. Tolerating cabergoline. Medication compliance excellent  . New event includes: PRL reduced from 4700 to 285 in 4 months, then now increase dose 2 tabs a week for the past 2 month. Less HA.   .  HPI: A 25 yo female here for the evaluation of elevated prolactin level.   Patient has had migraine HA since 2016, which required hospital visits 3 times in 10/2016, 2018 and 8/3/2022. Past 2 visit, she did not have any MRI done. Last one, she described she could not tolerate with severe HA and nausea. She underwent to MRI which revealed macro lesion.   Denied vision  disturbance. Lab work up showed dicreased IGF1 40 and significantly increased prolactin level >4700. She met with Dr. Braswell yesterday and she was referred to endocrinology office.   Other medical history : menarche age 10, and has been irregular cycle since age 14, currently on BCPs. Dyslipidemia for the past 2-3 years, A1C 4.9. Appenedctomy, carpal tunnel syndrome surgery in 12/2020. Denied galactorrhea       Past Medical/Surgical History:  No past medical history on file.  No past surgical history on file.    Allergies:  Allergies   Allergen Reactions     Adhesive Tape      Codeine        Current Medications   Current Outpatient Medications   Medication     cabergoline (DOSTINEX) 0.5 MG tablet     levothyroxine (SYNTHROID/LEVOTHROID) 75 MCG tablet     SPRINTEC 28 0.25-35 MG-MCG tablet     topiramate (TOPAMAX) 100 MG tablet     No current facility-administered medications for this visit.       Family History:  No family history on file.    Social History:  Social History     Tobacco Use     Smoking status: Never     Smokeless tobacco: Never   Substance Use Topics     Alcohol use: Yes       ROS:  Full review of systems taken with the help of the intake sheet. Otherwise a complete 14 point review of systems was taken and is negative unless stated in the history above.      Physical Exam:   Vitals: There were no vitals taken for this visit.  BMI= There is no height or weight on file to calculate BMI.   General: well appearing, no acute distress, pleasant and conversant,   Mental Status/neuro: alert and oriented  Face: symmetrical, normal facial color  Eyes: anicteric, no proptosis or lid lag  Resp: normally breathing      Labs : I reviewed data from epic and extract and summarize the pertinent data here.   Lab Results   Component Value Date     08/03/2022      Lab Results   Component Value Date    POTASSIUM 3.8 08/03/2022     Lab Results   Component Value Date    CHLORIDE 111 08/03/2022     Lab Results    Component Value Date    ERLINDA 9.0 08/03/2022     Lab Results   Component Value Date    CO2 23 08/03/2022     Lab Results   Component Value Date    BUN 13 08/03/2022     Lab Results   Component Value Date    CR 0.94 08/03/2022     Lab Results   Component Value Date    GLC 87 08/03/2022     Lab Results   Component Value Date    TSH 2.25 08/03/2022     No results found for: T4  No results found for: A1C    No results found for: IGF1  No results found for: LH  Lab Results   Component Value Date    FSH <0.3 08/03/2022     No results found for: ESTROGEN  No results found for: PROLACTIN        MRI Brain: I personally reviewed the original images and agree with the below reports.   Results for orders placed during the hospital encounter of 08/03/22    MR Brain w/o & w Contrast    Narrative  EXAM: MR BRAIN W/O and W CONTRAST  LOCATION: [facility]  DATE/TIME: 8/3/2022 6:02 PM    INDICATION: Headache, right eye weakness.    COMPARISON: None.    CONTRAST: Gadobutrol (GADAVIST) injection 10 mL.    TECHNIQUE: Routine multiplanar multisequence head MRI without and with intravenous contrast.    FINDINGS:  INTRACRANIAL CONTENTS: No acute or subacute infarct. No acute intracranial hemorrhage. Normal brain parenchymal signal. Normal ventricles and sulci. Normal position of the cerebellar tonsils. See below for sella and suprasellar findings. Otherwise no  abnormal parenchymal enhancement.    SELLA: There is an avidly enhancing sellar and suprasellar mass, measuring approximately 2.3 x 2.7 x 2.6 cm in diameter. The mass extends into the suprasellar cistern and abuts the optic chiasm. The mass extends laterally along the cavernous sinuses. The  vascular flow voids are grossly preserved.    OSSEOUS STRUCTURES/SOFT TISSUES: Normal marrow signal. The major intracranial vascular flow voids are maintained.    ORBITS: No abnormality accounting for technique.    SINUSES/MASTOIDS: Trace ethmoid mucosal thickening.  Small right mastoid effusion.    Impression  IMPRESSION: Avidly enhancing sellar and suprasellar mass measuring approximately 2.3 x 2.7 x 2.6 cm in size. 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 The mass extends into the suprasellar cistern and exerts mass effect on the optic  chiasm. Recommend neurosurgical consult.      Findings were discussed with Dr. Olvera at 1834 hours on 08/03/2022.

## 2023-10-22 ENCOUNTER — HEALTH MAINTENANCE LETTER (OUTPATIENT)
Age: 28
End: 2023-10-22

## 2024-10-06 ENCOUNTER — HEALTH MAINTENANCE LETTER (OUTPATIENT)
Age: 29
End: 2024-10-06